# Patient Record
Sex: FEMALE | Race: WHITE | Employment: UNEMPLOYED | URBAN - METROPOLITAN AREA
[De-identification: names, ages, dates, MRNs, and addresses within clinical notes are randomized per-mention and may not be internally consistent; named-entity substitution may affect disease eponyms.]

---

## 2020-03-13 ENCOUNTER — HOSPITAL ENCOUNTER (OUTPATIENT)
Dept: LAB | Facility: MEDICAL CENTER | Age: 50
End: 2020-03-13
Attending: INTERNAL MEDICINE
Payer: COMMERCIAL

## 2020-03-13 ENCOUNTER — OFFICE VISIT (OUTPATIENT)
Dept: ENDOCRINOLOGY | Facility: MEDICAL CENTER | Age: 50
End: 2020-03-13
Payer: COMMERCIAL

## 2020-03-13 VITALS
BODY MASS INDEX: 21.21 KG/M2 | HEART RATE: 88 BPM | OXYGEN SATURATION: 96 % | HEIGHT: 66 IN | DIASTOLIC BLOOD PRESSURE: 60 MMHG | SYSTOLIC BLOOD PRESSURE: 90 MMHG | WEIGHT: 132 LBS

## 2020-03-13 DIAGNOSIS — E04.2 NONTOXIC MULTINODULAR GOITER: ICD-10-CM

## 2020-03-13 DIAGNOSIS — Z83.49 FAMILY HISTORY OF HASHIMOTO THYROIDITIS: ICD-10-CM

## 2020-03-13 LAB
ALBUMIN SERPL BCP-MCNC: 4.6 G/DL (ref 3.2–4.9)
ALBUMIN/GLOB SERPL: 1.9 G/DL
ALP SERPL-CCNC: 38 U/L (ref 30–99)
ALT SERPL-CCNC: 10 U/L (ref 2–50)
ANION GAP SERPL CALC-SCNC: 10 MMOL/L (ref 7–16)
AST SERPL-CCNC: 16 U/L (ref 12–45)
BILIRUB SERPL-MCNC: 0.4 MG/DL (ref 0.1–1.5)
BUN SERPL-MCNC: 23 MG/DL (ref 8–22)
CALCIUM SERPL-MCNC: 9.6 MG/DL (ref 8.4–10.2)
CHLORIDE SERPL-SCNC: 105 MMOL/L (ref 96–112)
CO2 SERPL-SCNC: 28 MMOL/L (ref 20–33)
CREAT SERPL-MCNC: 0.61 MG/DL (ref 0.5–1.4)
GLOBULIN SER CALC-MCNC: 2.4 G/DL (ref 1.9–3.5)
GLUCOSE SERPL-MCNC: 91 MG/DL (ref 65–99)
POTASSIUM SERPL-SCNC: 4.2 MMOL/L (ref 3.6–5.5)
PROT SERPL-MCNC: 7 G/DL (ref 6–8.2)
SODIUM SERPL-SCNC: 143 MMOL/L (ref 135–145)
T4 FREE SERPL-MCNC: 1.27 NG/DL (ref 0.58–1.64)
THYROPEROXIDASE AB SERPL-ACNC: 1.2 IU/ML (ref 0–9)
TSH SERPL DL<=0.005 MIU/L-ACNC: 1.01 UIU/ML (ref 0.38–5.33)

## 2020-03-13 PROCEDURE — 86376 MICROSOMAL ANTIBODY EACH: CPT

## 2020-03-13 PROCEDURE — 82308 ASSAY OF CALCITONIN: CPT

## 2020-03-13 PROCEDURE — 36415 COLL VENOUS BLD VENIPUNCTURE: CPT

## 2020-03-13 PROCEDURE — 84439 ASSAY OF FREE THYROXINE: CPT

## 2020-03-13 PROCEDURE — 80053 COMPREHEN METABOLIC PANEL: CPT

## 2020-03-13 PROCEDURE — 99204 OFFICE O/P NEW MOD 45 MIN: CPT | Performed by: INTERNAL MEDICINE

## 2020-03-13 PROCEDURE — 84443 ASSAY THYROID STIM HORMONE: CPT

## 2020-03-13 RX ORDER — BIOTIN 10 MG
TABLET ORAL
COMMUNITY

## 2020-03-13 NOTE — PROGRESS NOTES
Chief Complaint: Consult requested by Mattie BATRES  for evaluation of Nontoxic Multinodular Goiter    HPI:     Yokasta Munson is a 49 y.o. female with history of Multinodular Goiter diagnosed January 2012 discovered by her previous primary care physician on palpation and is here for initial evaluation.      In summary she was diagnosed with nontoxic multinodular goiter in 2012 with a dominant 1.5 cm solid nodule on the right isthmus bridge which was biopsied twice first in November 2012 and second was in August 2014 which came back as benign.    Her last ultrasound was on December 2019 which showed a stable dominant previously biopsied 1.5 cm isthmus bridge nodule and other multiple subcentimeter nodules that are solid and cystic located on the right upper lobe, right lower lobe and left upper lobe and left isthmus.      She denies a family history of thyroid cancer.  She does have a family history of hypothyroidism and Hashimoto's thyroiditis with her sister.   She denies radiation exposure.  She denies anterior neck compressive symptoms.  She denies dysphagia hoarseness and dyspnea.  She denies diarrhea, flushing.  She denies unexplained weight loss, tremors, palpitations and insomnia.    We do not have records of her thyroid labs.    She denies taking thyroid hormone.      Patient's medications, allergies, and social histories were reviewed and updated as appropriate.      ROS:      CONS:     No fever, no chills, no weight loss, no fatigue   EYES:      No diplopia, no blurry vision, no redness of eyes, no swelling of eyelids   ENT:    No hearing loss, No ear pain, No sore throat, no dysphagia, no neck swelling   CV:     No chest pain, no palpitations, no claudication, no orthopnea, no PND   PULM:    No SOB, no cough, no hemoptysis, no wheezing    GI:   No nausea, no vomiting, no diarrhea, no constipation, no bloody stools   :  Passing urine well, no dysuria, no hematuria   ENDO:   No polyuria, no  polydipsia, no heat intolerance, no cold intolerance   NEURO: No headaches, no dizziness, no convulsions, no tremors   MUSC:  No joint swellings, no arthralgias, no myalgias, no weakness   SKIN:   No rash, no ulcers, no dry skin   PSYCH:   No depression, no anxiety, no difficulty sleeping       Past Medical History:  Patient Active Problem List    Diagnosis Date Noted   • Cervical radiculopathy 10/24/2011   • Carpal tunnel syndrome of right wrist 10/24/2011       Past Surgical History:  Past Surgical History:   Procedure Laterality Date   • CYSTOSCOPY STENT PLACEMENT  12/8/2011    Performed by MARIANO SUNG at SURGERY [facility]   • URETEROSCOPY  12/8/2011    Performed by MARIANO SUNG at SUR[facility]ERY [facility]   • LASERTRIPSY  12/8/2011    Performed by MARIANO SUNG at SUR[facility]ERY [facility]   • CERVICAL DISK AND FUSION ANTERIOR  11/8/2011    Performed by RADHA MIR at SURGERY [facility]   • ABDOMINAL EXPLORATION     • OTHER     • OTHER ABDOMINAL SURGERY     • OTHER ORTHOPEDIC SURGERY     • PRIMARY C SECTION          Allergies:  Bloodless; Percocet [oxycodone-acetaminophen]; and Valium     Current Medications:    Current Outpatient Medications:   •  Multiple Vitamins-Minerals (MULTIVITAMIN ADULT) Chew Tab, Take  by mouth., Disp: , Rfl:     Social History:  Social History     Socioeconomic History   • Marital status:      Spouse name: Not on file   • Number of children: Not on file   • Years of education: Not on file   • Highest education level: Not on file   Occupational History   • Not on file   Social Needs   • Financial resource strain: Not on file   • Food insecurity     Worry: Not on file     Inability: Not on file   • Transportation needs     Medical: Not on file     Non-medical: Not on file   Tobacco Use   • Smoking status: Never Smoker   • Smokeless tobacco: Never Used   Substance and Sexual Activity   • Alcohol use: Yes     Alcohol/week: 0.5 oz     Types: 1  "Glasses of wine per week     Comment: 1-2 glasses of wine per week   • Drug use: No   • Sexual activity: Yes     Partners: Male   Lifestyle   • Physical activity     Days per week: Not on file     Minutes per session: Not on file   • Stress: Not on file   Relationships   • Social connections     Talks on phone: Not on file     Gets together: Not on file     Attends Congregation service: Not on file     Active member of club or organization: Not on file     Attends meetings of clubs or organizations: Not on file     Relationship status: Not on file   • Intimate partner violence     Fear of current or ex partner: Not on file     Emotionally abused: Not on file     Physically abused: Not on file     Forced sexual activity: Not on file   Other Topics Concern   • Not on file   Social History Narrative   • Not on file        Family History:   Family History   Problem Relation Age of Onset   • Anemia Unknown          PHYSICAL EXAM:   Vital signs: BP (!) 90/60   Pulse 88   Ht 1.676 m (5' 6\")   Wt 59.9 kg (132 lb)   SpO2 96%   BMI 21.31 kg/m²   GENERAL: Well-developed, well-nourished  in no apparent distress.   EYE: No ocular and eyelid asymmetry, Anicteric sclerae,  PERRL, No exophthalmos or lidlag  HENT: Hearing grossly intact, Normocephalic, atraumatic. Pink, moist mucous membranes, No exudate  NECK: Supple. Trachea midline.  Thyroid is slightly enlarged with a palpable exophytic solid nodule on the right isthmus bridge measuring 1.5 cm.  It moves with swallowing.  CARDIOVASCULAR: Regular rate and rhythm. No murmurs, rubs, or gallops.   LUNGS: Clear to auscultation bilaterally   ABDOMEN: Soft, nontender with positive bowel sounds.   EXTREMITIES: No clubbing, cyanosis, or edema.   NEUROLOGICAL: Cranial nerves II-XII are grossly intact   Symmetric reflexes at the patella no proximal muscle weakness, No visible tremor with both outstretched hands  LYMPH: No cervical, supraclavicular,  adenopathy palpated.   SKIN: No " rashes, lesions. Turgor is normal.    Labs:  Lab Results   Component Value Date/Time    WBC 7.4 12/09/2011 04:44 AM    RBC 3.82 (L) 12/09/2011 04:44 AM    HEMOGLOBIN 11.7 (L) 12/09/2011 04:44 AM    MCV 88.2 12/09/2011 04:44 AM    MCH 30.7 12/09/2011 04:44 AM    MCHC 34.8 12/09/2011 04:44 AM    RDW 12.7 12/09/2011 04:44 AM    MPV 8.1 12/09/2011 04:44 AM       Lab Results   Component Value Date/Time    SODIUM 134 (L) 12/09/2011 04:44 AM    POTASSIUM 4.3 12/09/2011 04:44 AM    CHLORIDE 106 12/09/2011 04:44 AM    CO2 24 12/09/2011 04:44 AM    ANION 4.0 12/09/2011 04:44 AM    GLUCOSE 156 (H) 12/09/2011 04:44 AM    BUN 7 (L) 12/09/2011 04:44 AM    CREATININE 0.75 12/09/2011 04:44 AM    CREATININE 0.6 02/27/2007 04:35 PM    CALCIUM 8.4 12/09/2011 04:44 AM    ASTSGOT 16 12/09/2011 04:44 AM    ALTSGPT 11 12/09/2011 04:44 AM    TBILIRUBIN 0.7 12/09/2011 04:44 AM    ALBUMIN 3.0 (L) 12/09/2011 04:44 AM    TOTPROTEIN 5.6 (L) 12/09/2011 04:44 AM    GLOBULIN 2.6 12/09/2011 04:44 AM    AGRATIO 1.2 12/09/2011 04:44 AM       No results found for: TSHULTRASEN  No results found for: FREET4  No results found for: FREET3  No results found for: THYSTIMIG      Imaging: Please see outside imaging completed at [facility] on December 16, 2019        ASSESSMENT/PLAN:     1. Nontoxic multinodular goiter  Stable  Reviewed pathogenesis of thyroid nodules and risk of malignancy and risk of growth.  She has multiple subcentimeter nodules with a dominant right isthmus lobe solid nodule which has been previously biopsied twice with benign findings.  Her nodules appear to be stable per my read  Repeat ultrasound-guided biopsy is not indicated  Sonographic observation and follow-up is recommended  I am going to obtain a TSH today to help reassure that her thyroid function is normal  We will plan for follow-up in 6 to 9 months with a repeat of her TSH and ultrasound    2. Family history of Hashimoto thyroiditis  Patient has a family history of  autoimmune thyroid disease  I am going to check a TSH, free T4 and TPO antibody level today and update her if she has hypothyroidism like her family members      Return in about 9 months (around 12/13/2020).       Thank you kindly for allowing me to participate in the thyroid care plan for this patient.    Tremayne Moctezuma MD, FACE, [facility]  03/13/20    CC:   Pcp Not In Computer

## 2020-03-15 LAB — CALCIT SERPL-MCNC: 5 PG/ML (ref 0–5.1)

## 2020-12-14 ENCOUNTER — APPOINTMENT (OUTPATIENT)
Dept: ENDOCRINOLOGY | Facility: MEDICAL CENTER | Age: 50
End: 2020-12-14
Payer: COMMERCIAL